# Patient Record
Sex: MALE | Race: ASIAN | URBAN - METROPOLITAN AREA
[De-identification: names, ages, dates, MRNs, and addresses within clinical notes are randomized per-mention and may not be internally consistent; named-entity substitution may affect disease eponyms.]

---

## 2024-09-03 ENCOUNTER — EMERGENCY (EMERGENCY)
Facility: HOSPITAL | Age: 34
LOS: 1 days | Discharge: HOME | End: 2024-09-03
Admitting: EMERGENCY MEDICINE
Payer: COMMERCIAL

## 2024-09-03 VITALS
TEMPERATURE: 98 F | OXYGEN SATURATION: 98 % | SYSTOLIC BLOOD PRESSURE: 150 MMHG | DIASTOLIC BLOOD PRESSURE: 88 MMHG | RESPIRATION RATE: 16 BRPM | HEART RATE: 76 BPM

## 2024-09-03 PROCEDURE — 73590 X-RAY EXAM OF LOWER LEG: CPT | Mod: 26,LT

## 2024-09-03 PROCEDURE — 99284 EMERGENCY DEPT VISIT MOD MDM: CPT | Mod: 25

## 2024-09-03 PROCEDURE — 12001 RPR S/N/AX/GEN/TRNK 2.5CM/<: CPT

## 2024-09-03 RX ORDER — BACITRACIN 500 UNIT/G
1 OINTMENT (GRAM) TOPICAL
Qty: 1 | Refills: 0
Start: 2024-09-03 | End: 2024-09-07

## 2024-09-03 RX ORDER — ACETAMINOPHEN 325 MG/1
975 TABLET ORAL ONCE
Refills: 0 | Status: COMPLETED | OUTPATIENT
Start: 2024-09-03 | End: 2024-09-03

## 2024-09-03 RX ORDER — LIDOCAINE HCL/EPINEPHRINE 2%-1:50000
20 SYRINGE (ML) INJECTION ONCE
Refills: 0 | Status: COMPLETED | OUTPATIENT
Start: 2024-09-03 | End: 2024-09-03

## 2024-09-03 RX ORDER — BACITRACIN 500 UNIT/G
1 OINTMENT (GRAM) TOPICAL ONCE
Refills: 0 | Status: COMPLETED | OUTPATIENT
Start: 2024-09-03 | End: 2024-09-03

## 2024-09-03 RX ADMIN — ACETAMINOPHEN 975 MILLIGRAM(S): 325 TABLET ORAL at 12:16

## 2024-09-03 RX ADMIN — Medication 20 MILLILITER(S): at 12:17

## 2024-09-03 RX ADMIN — Medication 1 APPLICATION(S): at 12:16

## 2024-09-03 NOTE — ED PROVIDER NOTE - OBJECTIVE STATEMENT
33-year-old male, denies past medical history, presents after mechanical trip and fall in the street today.  States he was walking when a movable great protruded up without warning and caused him to trip and fall.  Patient states he hit the right top of his head and did sustain some scratches on his face but his biggest injury was noted on his left anterior lower leg. Patient denies headaches or dizziness.  He also denies any facial pain as well.  He had been able to ambulate to the emergency room but endorses pain in the left lower leg when he does so.

## 2024-09-03 NOTE — ED ADULT TRIAGE NOTE - CHIEF COMPLAINT QUOTE
BIBEMS sp trip/fall today. Pt +hs, -loc. Denies taking blood thinners. Abrasion to chin and lac to left shin. Up to date on tetanus shot.

## 2024-09-03 NOTE — ED PROVIDER NOTE - CLINICAL SUMMARY MEDICAL DECISION MAKING FREE TEXT BOX
33-year-old male, denies past medical history, presents after mechanical trip and fall in the street today. On exam patient found to have a 2 cm laceration with a 1.5 cm linear abrasion connecting to it [in middle of the anterior tibia].  The laceration will likely require suture repair.  He is also found to have some soft tissue swelling around the area which is likely a hematoma and it is tender to palpation.  No tenderness along the distal lower leg or ankle as well as no tenderness to palpation along the knee with full range of motion of both lower extremity joints.  Distal pulses of the lower extremity are also intact bilaterally.  Wound cleaned with normal saline and will plan for suture repair once x-ray confirms underlying fracture.  Patient's tetanus is up-to-date.  No facial bone tenderness to palpation on exam or scalp tenderness or hematomas.  He has 2 abrasions on the face 1 along the right maxillary region and 1 along the right anterior mandible region.  Wounds cleaned with normal saline and bacitracin applied.  Patient given p.o. Tylenol for pain relief.  Will reassess.

## 2024-09-03 NOTE — ED ADULT NURSE NOTE - OBJECTIVE STATEMENT
"BIBEMS sp trip/fall today. Pt +hs, -loc. Denies taking blood thinners. Abrasion to chin and lac to left shin. Up to date on tetanus shot."    pt stable, not in any acute distress, care ongoing.

## 2024-09-03 NOTE — ED PROVIDER NOTE - NSFOLLOWUPINSTRUCTIONS_ED_ALL_ED_FT
You will need to return to the emergency department for suture removal in 12-14 days.  Please apply topical bacitracin once a day to the multiple abrasions noted on your lower extremities as well as face.  Return to the ER for any redness in the area of the wounds, increased pain or swelling, headaches, dizziness, changes in vision, nausea or vomiting.  You can take Tylenol as needed for pain relief.  A prescription for bacitracin has been sent to your preferred pharmacy to  medications.  Please follow-up your primary care doctor as well.    Laceration    A laceration is a cut that goes through all of the layers of the skin and into the tissue that is right under the skin. Some lacerations heal on their own. Others need to be closed with skin adhesive strips, skin glue, stitches (sutures), or staples. Proper laceration care minimizes the risk of infection and helps the laceration to heal better.  If non-absorbable stitches or staples have been placed, they must be taken out within the time frame instructed by your healthcare provider.    SEEK IMMEDIATE MEDICAL CARE IF YOU HAVE ANY OF THE FOLLOWING SYMPTOMS: swelling around the wound, worsening pain, drainage from the wound, red streaking going away from your wound, inability to move finger or toe near the laceration, or discoloration of skin near the laceration.     Understanding Your Risk for Falls  Millions of people have serious injuries from falls each year. It is important to understand your risk of falling. Talk with your health care provider about your risk and what you can do to lower it.    If you do have a serious fall, make sure to tell your provider. Falling once raises your risk of falling again.    How can falls affect me?  Serious injuries from falls are common. These include:  Broken bones, such as hip fractures.  Head injuries, such as traumatic brain injuries (TBI) or concussions.  A fear of falling can cause you to avoid activities and stay at home. This can make your muscles weaker and raise your risk for a fall.    What can increase my risk?  There are a number of risk factors that increase your risk for falling. The more risk factors you have, the higher your risk of falling. Serious injuries from a fall happen most often to people who are older than 65 years old. Teenagers and young adults ages 15–29 are also at higher risk.    Common risk factors include:  Weakness in the lower body.  Being generally weak or confused due to long-term (chronic) illness.  Dizziness or balance problems.  Poor vision.  Medicines that cause dizziness or drowsiness. These may include:  Medicines for your blood pressure, heart, anxiety, insomnia, or swelling (edema).  Pain medicines.  Muscle relaxants.  Other risk factors include:  Drinking alcohol.  Having had a fall in the past.  Having foot pain or wearing improper footwear.  Working at a dangerous job.  Having any of the following in your home:  Tripping hazards, such as floor clutter or loose rugs.  Poor lighting.  Pets.  Having dementia or memory loss.  What actions can I take to lower my risk of falling?  Shower and bathtub, showing safety grab bars on the walls.  A grab bar next to a toilet.  Physical activity    Stay physically fit. Do strength and balance exercises. Consider taking a regular class to build strength and balance. Yoga and vanessa chi are good options.    Vision    Have your eyes checked every year and your prescription for glasses or contacts updated as needed.    Shoes and walking aids    Wear non-skid shoes.  Wear shoes that have rubber soles and low heels.  Do not wear high heels.  Do not walk around the house in socks or slippers.  Use a cane or walker as told by your provider.  Home safety    Attach secure railings on both sides of your stairs.  Install grab bars for your bathtub, shower, and toilet. Use a non-skid mat in your bathtub or shower. Attach bath mats securely with double-sided, non-slip rug tape.  Use good lighting in all rooms. Keep a flashlight near your bed.  Make sure there is a clear path from your bed to the bathroom. Use night-lights.  Do not use throw rugs. Make sure all carpeting is taped or tacked down securely.  Remove all clutter from walkways and stairways, including extension cords.  Repair uneven or broken steps and floors.  Avoid walking on icy or slippery surfaces. Walk on the grass instead of on icy or slick sidewalks. Use ice melter to get rid of ice on walkways in the winter.  Use a cordless phone.

## 2024-09-03 NOTE — ED PROVIDER NOTE - PHYSICAL EXAMINATION
VITAL SIGNS: I have reviewed nursing notes and confirm.  CONSTITUTIONAL: Well-developed; well-nourished; in no acute distress.  SKIN: Skin is warm and dry, no acute rash.  HEAD: Normocephalic; 1cm abrasion along the R maxilla and a 1cm abrasion along the R anterior mandibular region w/o facial bone [orbital, nasal, maxillary, and mandible] ttp; no trismus.  EYES: PERRL, EOMI.   NECK: Supple; non tender - midline.  CARD: S1, S2 normal; no murmurs, gallops, or rubs. Regular rate and rhythm.  RESP: No wheezes, rales or rhonchi.  ABD: Soft; non-distended; non-tender; no rebound or guarding.  EXT: Normal ROM of wiliam LEs. No clubbing, cyanosis or edema. +2cm lac in middle of the L anterior tibia w/ 1.5cm connecting linear abrasion [superficial].  NEURO: Alert, oriented. Grossly unremarkable. MEREDITH, normal tone, no gross motor or sensory changes. Fluent speech.   PSYCH: Cooperative, appropriate. Mood and affect wnl.

## 2024-09-03 NOTE — ED PROVIDER NOTE - PATIENT PORTAL LINK FT
You can access the FollowMyHealth Patient Portal offered by [facility] by registering at the following website: [email or web address]. By joining [facility]’s FollowMyHealth portal, you will also be able to view your health information using other applications (apps) compatible with our system.

## 2024-09-03 NOTE — ED PROVIDER NOTE - NS ED ROS FT
Dr. Del Real +fall  +LE laceration  +facial abraions  Denies fevers, chills, nausea, vomiting, diarrhea, constipation, abdominal pain, urinary symptoms, chest pain, palpitations, shortness of breath, dyspnea on exertion, syncope/near syncope, cough/URI symptoms, headache, weakness, numbness, focal deficits, visual changes, gait or balance changes, dizziness

## 2024-09-03 NOTE — ED PROVIDER NOTE - PROGRESS NOTE DETAILS
No fractures noted on x-ray.  Wound repaired with 4 sutures and patient tolerates well.  Instructed to return in 12 to 14 days for suture removal.  Will plan to discharge with bacitracin to use once a day as needed for the multiple abrasions and for over the laceration.  Patient given strict return precautions for any acute worsening symptoms and leaves the ED in no acute distress. No fractures noted on x-ray.  Wound repaired with 4 sutures and patient tolerates well.  Instructed to return in 12 to 14 days for suture removal.  Will plan to discharge with bacitracin to use once a day as needed for the multiple abrasions and for over the laceration. Patient given supplies for wound dressing as well. Patient given strict return precautions for any acute worsening symptoms and leaves the ED in no acute distress.

## 2024-09-06 DIAGNOSIS — S81.812A LACERATION WITHOUT FOREIGN BODY, LEFT LOWER LEG, INITIAL ENCOUNTER: ICD-10-CM
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
## 2024-09-06 DIAGNOSIS — S00.81XA ABRASION OF OTHER PART OF HEAD, INITIAL ENCOUNTER: ICD-10-CM
